# Patient Record
Sex: MALE | Race: WHITE | Employment: FULL TIME | URBAN - METROPOLITAN AREA
[De-identification: names, ages, dates, MRNs, and addresses within clinical notes are randomized per-mention and may not be internally consistent; named-entity substitution may affect disease eponyms.]

---

## 2020-11-19 RX ORDER — CETIRIZINE HCL 10 MG
10 TABLET ORAL
COMMUNITY

## 2020-11-20 ENCOUNTER — TRANSCRIBE ORDER (OUTPATIENT)
Dept: REGISTRATION | Age: 60
End: 2020-11-20

## 2020-11-20 ENCOUNTER — HOSPITAL ENCOUNTER (OUTPATIENT)
Dept: PREADMISSION TESTING | Age: 60
Discharge: HOME | End: 2020-11-20
Payer: OTHER GOVERNMENT

## 2020-11-20 DIAGNOSIS — Z01.812 BLOOD TESTS PRIOR TO TREATMENT OR PROCEDURE: ICD-10-CM

## 2020-11-20 DIAGNOSIS — Z01.812 BLOOD TESTS PRIOR TO TREATMENT OR PROCEDURE: Primary | ICD-10-CM

## 2020-11-20 DIAGNOSIS — Z20.828 EXPOSURE TO SARS-ASSOCIATED CORONAVIRUS: ICD-10-CM

## 2020-11-20 PROCEDURE — 87635 SARS-COV-2 COVID-19 AMP PRB: CPT

## 2020-11-21 LAB — SARS-COV-2, COV2NT: NOT DETECTED

## 2020-11-23 ENCOUNTER — ANESTHESIA EVENT (OUTPATIENT)
Dept: SURGERY | Age: 60
End: 2020-11-23
Payer: OTHER GOVERNMENT

## 2020-11-23 NOTE — PERIOP NOTES
Called surgeon's office and left message on voicemail for Francine Dudley regarding missing Med. Cl. for this patient.

## 2020-11-24 ENCOUNTER — HOSPITAL ENCOUNTER (OUTPATIENT)
Age: 60
Setting detail: OUTPATIENT SURGERY
Discharge: HOME | End: 2020-11-24
Attending: OPHTHALMOLOGY | Admitting: OPHTHALMOLOGY
Payer: OTHER GOVERNMENT

## 2020-11-24 ENCOUNTER — ANESTHESIA (OUTPATIENT)
Dept: SURGERY | Age: 60
End: 2020-11-24
Payer: OTHER GOVERNMENT

## 2020-11-24 VITALS
WEIGHT: 231.5 LBS | BODY MASS INDEX: 31.36 KG/M2 | RESPIRATION RATE: 18 BRPM | OXYGEN SATURATION: 99 % | TEMPERATURE: 97.7 F | DIASTOLIC BLOOD PRESSURE: 98 MMHG | HEIGHT: 72 IN | SYSTOLIC BLOOD PRESSURE: 153 MMHG | HEART RATE: 78 BPM

## 2020-11-24 PROCEDURE — 74011250636 HC RX REV CODE- 250/636: Performed by: NURSE ANESTHETIST, CERTIFIED REGISTERED

## 2020-11-24 PROCEDURE — 77030029082 HC LEN VITRCTMY DISP DTCH -B: Performed by: OPHTHALMOLOGY

## 2020-11-24 PROCEDURE — 76010000162 HC OR TIME 1.5 TO 2 HR INTENSV-TIER 1: Performed by: OPHTHALMOLOGY

## 2020-11-24 PROCEDURE — 77030016693: Performed by: OPHTHALMOLOGY

## 2020-11-24 PROCEDURE — 77030026076 HC LNS MCSCP SPRVW DSP MCGM -B: Performed by: OPHTHALMOLOGY

## 2020-11-24 PROCEDURE — 77030026916 HC ERAS HEMSTAT BVTC -B: Performed by: OPHTHALMOLOGY

## 2020-11-24 PROCEDURE — 77030018838 HC SOL IRR OPTH ALCN -B: Performed by: OPHTHALMOLOGY

## 2020-11-24 PROCEDURE — 77030025717 HC KT PIK VIT CONSTL ALCN -F: Performed by: OPHTHALMOLOGY

## 2020-11-24 PROCEDURE — 2709999900 HC NON-CHARGEABLE SUPPLY: Performed by: OPHTHALMOLOGY

## 2020-11-24 PROCEDURE — 74011000250 HC RX REV CODE- 250: Performed by: NURSE ANESTHETIST, CERTIFIED REGISTERED

## 2020-11-24 PROCEDURE — 00145 ANES PX EYE VITREORTNL SURG: CPT | Performed by: NURSE ANESTHETIST, CERTIFIED REGISTERED

## 2020-11-24 PROCEDURE — 00145 ANES PX EYE VITREORTNL SURG: CPT | Performed by: ANESTHESIOLOGY

## 2020-11-24 PROCEDURE — 74011000250 HC RX REV CODE- 250: Performed by: OPHTHALMOLOGY

## 2020-11-24 PROCEDURE — C1814 RETINAL TAMP, SILICONE OIL: HCPCS | Performed by: OPHTHALMOLOGY

## 2020-11-24 PROCEDURE — 77030013248 HC AGNT VISCEL ALCN -E: Performed by: OPHTHALMOLOGY

## 2020-11-24 PROCEDURE — 77030013311: Performed by: OPHTHALMOLOGY

## 2020-11-24 PROCEDURE — 74011250637 HC RX REV CODE- 250/637: Performed by: NURSE ANESTHETIST, CERTIFIED REGISTERED

## 2020-11-24 PROCEDURE — 77030013353: Performed by: OPHTHALMOLOGY

## 2020-11-24 PROCEDURE — 76210000020 HC REC RM PH II FIRST 0.5 HR: Performed by: OPHTHALMOLOGY

## 2020-11-24 PROCEDURE — 76060000034 HC ANESTHESIA 1.5 TO 2 HR: Performed by: OPHTHALMOLOGY

## 2020-11-24 PROCEDURE — 74011250636 HC RX REV CODE- 250/636: Performed by: OPHTHALMOLOGY

## 2020-11-24 RX ORDER — CYCLOPENTOLATE HYDROCHLORIDE 10 MG/ML
1 SOLUTION/ DROPS OPHTHALMIC
Status: COMPLETED | OUTPATIENT
Start: 2020-11-24 | End: 2020-11-24

## 2020-11-24 RX ORDER — GLYCOPYRROLATE 0.2 MG/ML
INJECTION INTRAMUSCULAR; INTRAVENOUS AS NEEDED
Status: DISCONTINUED | OUTPATIENT
Start: 2020-11-24 | End: 2020-11-24 | Stop reason: HOSPADM

## 2020-11-24 RX ORDER — CEFAZOLIN SODIUM 1 G/3ML
INJECTION, POWDER, FOR SOLUTION INTRAMUSCULAR; INTRAVENOUS AS NEEDED
Status: DISCONTINUED | OUTPATIENT
Start: 2020-11-24 | End: 2020-11-24 | Stop reason: HOSPADM

## 2020-11-24 RX ORDER — DEXAMETHASONE SODIUM PHOSPHATE 4 MG/ML
INJECTION, SOLUTION INTRA-ARTICULAR; INTRALESIONAL; INTRAMUSCULAR; INTRAVENOUS; SOFT TISSUE AS NEEDED
Status: DISCONTINUED | OUTPATIENT
Start: 2020-11-24 | End: 2020-11-24 | Stop reason: HOSPADM

## 2020-11-24 RX ORDER — ATROPINE SULFATE 10 MG/ML
SOLUTION/ DROPS OPHTHALMIC AS NEEDED
Status: DISCONTINUED | OUTPATIENT
Start: 2020-11-24 | End: 2020-11-24 | Stop reason: HOSPADM

## 2020-11-24 RX ORDER — PROPOFOL 10 MG/ML
INJECTION, EMULSION INTRAVENOUS AS NEEDED
Status: DISCONTINUED | OUTPATIENT
Start: 2020-11-24 | End: 2020-11-24 | Stop reason: HOSPADM

## 2020-11-24 RX ORDER — TRIAMCINOLONE ACETONIDE 40 MG/ML
INJECTION, SUSPENSION INTRA-ARTICULAR; INTRAMUSCULAR AS NEEDED
Status: DISCONTINUED | OUTPATIENT
Start: 2020-11-24 | End: 2020-11-24 | Stop reason: HOSPADM

## 2020-11-24 RX ORDER — LIDOCAINE HYDROCHLORIDE 20 MG/ML
INJECTION, SOLUTION EPIDURAL; INFILTRATION; INTRACAUDAL; PERINEURAL AS NEEDED
Status: DISCONTINUED | OUTPATIENT
Start: 2020-11-24 | End: 2020-11-24 | Stop reason: HOSPADM

## 2020-11-24 RX ORDER — FAMOTIDINE 20 MG/1
20 TABLET, FILM COATED ORAL ONCE
Status: COMPLETED | OUTPATIENT
Start: 2020-11-24 | End: 2020-11-24

## 2020-11-24 RX ORDER — SODIUM CHLORIDE, SODIUM LACTATE, POTASSIUM CHLORIDE, CALCIUM CHLORIDE 600; 310; 30; 20 MG/100ML; MG/100ML; MG/100ML; MG/100ML
25 INJECTION, SOLUTION INTRAVENOUS CONTINUOUS
Status: DISCONTINUED | OUTPATIENT
Start: 2020-11-24 | End: 2020-11-24 | Stop reason: HOSPADM

## 2020-11-24 RX ORDER — PHENYLEPHRINE HYDROCHLORIDE 25 MG/ML
1 SOLUTION/ DROPS OPHTHALMIC
Status: COMPLETED | OUTPATIENT
Start: 2020-11-24 | End: 2020-11-24

## 2020-11-24 RX ORDER — PROPARACAINE HYDROCHLORIDE 5 MG/ML
1 SOLUTION/ DROPS OPHTHALMIC ONCE
Status: COMPLETED | OUTPATIENT
Start: 2020-11-24 | End: 2020-11-24

## 2020-11-24 RX ADMIN — PROPARACAINE HYDROCHLORIDE 1 DROP: 5 SOLUTION/ DROPS OPHTHALMIC at 06:15

## 2020-11-24 RX ADMIN — PROPOFOL 140 MG: 10 INJECTION, EMULSION INTRAVENOUS at 08:05

## 2020-11-24 RX ADMIN — CYCLOPENTOLATE HYDROCHLORIDE 1 DROP: 10 SOLUTION/ DROPS OPHTHALMIC at 06:39

## 2020-11-24 RX ADMIN — PHENYLEPHRINE HYDROCHLORIDE 1 DROP: 25 SOLUTION/ DROPS OPHTHALMIC at 07:07

## 2020-11-24 RX ADMIN — SODIUM CHLORIDE, SODIUM LACTATE, POTASSIUM CHLORIDE, AND CALCIUM CHLORIDE 25 ML/HR: 600; 310; 30; 20 INJECTION, SOLUTION INTRAVENOUS at 06:23

## 2020-11-24 RX ADMIN — CYCLOPENTOLATE HYDROCHLORIDE 1 DROP: 10 SOLUTION/ DROPS OPHTHALMIC at 07:07

## 2020-11-24 RX ADMIN — PHENYLEPHRINE HYDROCHLORIDE 1 DROP: 25 SOLUTION/ DROPS OPHTHALMIC at 06:16

## 2020-11-24 RX ADMIN — GLYCOPYRROLATE 0.4 MG: 0.2 INJECTION INTRAMUSCULAR; INTRAVENOUS at 08:14

## 2020-11-24 RX ADMIN — LIDOCAINE HYDROCHLORIDE 50 MG: 20 INJECTION, SOLUTION INTRAVENOUS at 08:05

## 2020-11-24 RX ADMIN — CYCLOPENTOLATE HYDROCHLORIDE 1 DROP: 10 SOLUTION/ DROPS OPHTHALMIC at 06:15

## 2020-11-24 RX ADMIN — FAMOTIDINE 20 MG: 20 TABLET ORAL at 06:07

## 2020-11-24 RX ADMIN — PHENYLEPHRINE HYDROCHLORIDE 1 DROP: 25 SOLUTION/ DROPS OPHTHALMIC at 06:39

## 2020-11-24 NOTE — PERIOP NOTES
.Report received from [address]:    11/19/20 1215 11/24/20 0608 11/24/20 0930 11/24/20 0939   BP:  (!) 140/84 (!) 153/98    Pulse:  60 76 78   Resp:  18 16 18   Temp:  97.5 °F (36.4 °C) 97.7 °F (36.5 °C) 97.7 °F (36.5 °C)   SpO2:  96% 100% 99%   Weight: 105.2 kg (232 lb) 105 kg (231 lb 8 oz)     Height: 5' 11.5\" (1.816 m) 6' (1.829 m)          Patient  is oriented to time, place, person and situation    Patient OOB to chair, and tolerating fluids and activity. Vital signs stable. Pain tolerable. Lines and Drains  Peripheral Intravenous Line:      Patient assisted to chair with minimal assistance. Discharge instructions were given to patient and discussed with the wifeLori [phone](W telephone. ..due to to emergency protocols in place). No questions or concerns at this time. Patient had time to ask any questions. Patient and caregiver verbalized understanding of discharge instructions.      Patient discharged home with Jamel Coronel RN

## 2020-11-24 NOTE — ANESTHESIA POSTPROCEDURE EVALUATION
Procedure(s):  23G Valved VItrectomy Membrane Peel Laser Treatment Silicone oil Right Eye. MAC, general - backup    Anesthesia Post Evaluation      Multimodal analgesia: multimodal analgesia used between 6 hours prior to anesthesia start to PACU discharge  Patient location during evaluation: PACU  Patient participation: complete - patient participated  Level of consciousness: awake  Pain score: 0  Pain management: adequate  Airway patency: positional obstruction  Anesthetic complications: no  Cardiovascular status: acceptable  Respiratory status: acceptable  Hydration status: acceptable  Post anesthesia nausea and vomiting:  none  Final Post Anesthesia Temperature Assessment:  Normothermia (36.0-37.5 degrees C)      INITIAL Post-op Vital signs: No vitals data found for the desired time range.

## 2020-11-24 NOTE — DISCHARGE INSTRUCTIONS
Patient Education      Patient Education        Vitrectomy: What to Expect at Home  Your Recovery     Vitrectomy is a surgery to remove the vitreous gel from the middle of your eye. Vitreous gel (also called vitreous humor) is a thick, colorless, gel-like fluid that fills the large space in the middle of the eye, behind the lens. It helps the eyeball maintain its shape. During surgery, the doctor used small tools to remove the vitreous gel. (After a while, the eye makes new fluid that fills in the space again.) Then the doctor may have treated eye problems, such as a retinal detachment, a vitreous hemorrhage (bleeding in the eye), scar tissue on the retina, or tears or holes in the macula, an important part of the retina. The retina is the layer of nerve tissue at the back of the eye. At the end of the surgery, the doctor may have injected an oil or gas bubble into the eye. It lightly presses the retina against the wall of the eye. You will need to keep your head in a certain position for most of the day and night while the eye heals. If an oil bubble is used, you will need another surgery to remove the oil after the eye has healed. After the surgery, your eye may be swollen, red, or tender for several weeks. You might have some pain in your eye and your vision may be blurry for a few days after the surgery. You will need 2 to 4 weeks to recover before you can do your normal activities again. It may take longer for your vision to get back to normal.  This care sheet gives you a general idea about how long it will take for you to recover. But each person recovers at a different pace. Follow the steps below to get better as quickly as possible. How can you care for yourself at home? Activity    · Rest when you feel tired.     · Allow the eye to heal. Don't do things that might cause you to move your head.  This includes moving quickly, lifting anything heavy, or doing activities such as cleaning or gardening.     · If your doctor used an oil or gas bubble to hold the retina in place, keep your head in a certain position for most of the day and night for 1 to 3 weeks after the surgery. Make a plan for this part of your recovery, because it will be hard to do some daily activities. Your doctor will give you specific instructions. ? Do not lie on your back, or the bubble will move to the front of the eye and press against the lens instead of the retina.     · If your doctor used a gas bubble, avoid airplane travel until your doctor tells you it is safe. This is because the change in altitude may cause the gas bubble to expand and increase the pressure inside the eye.     · You will probably need to take 2 to 4 weeks off from work. It depends on the type of work you do and how you feel.     · You may drive when your vision allows it. If you are not sure, ask your doctor. Diet    · You can eat your normal diet. If your stomach is upset, try bland, low-fat foods like plain rice, broiled chicken, toast, and yogurt. Medicines    · Your doctor will tell you if and when you can restart your medicines. He or she will also give you instructions about taking any new medicines.     · If you take aspirin or some other blood thinner, be sure to talk to your doctor. He or she will tell you if and when to start taking this medicine again. Make sure that you understand exactly what your doctor wants you to do.     · Be safe with medicines. Read and follow all instructions on the label. ? If the doctor gave you a prescription medicine for pain, take it as prescribed. ? If you are not taking a prescription pain medicine, ask your doctor if you can take an over-the-counter medicine.     · You will need to use eyedrops for up to 6 weeks. Ice and elevation    · Close your eye and put ice or a cold pack on it for 10 to 20 minutes at a time.  Try to do this every 1 to 2 hours for the next 3 days (when you are awake) or until the swelling goes down. Put a thin cloth between the ice and your skin. Other instructions    · You can shower and wash your hair and face. But don't get any soap in your eye. You may want to use a wash cloth to wash your face. Some people wear swimming goggles.     · Wear sunglasses during the day. You may have to wear an eye patch or shield for a few days. Follow-up care is a key part of your treatment and safety. Be sure to make and go to all appointments, and call your doctor if you are having problems. It's also a good idea to know your test results and keep a list of the medicines you take. When should you call for help? Call 911 anytime you think you may need emergency care. For example, call if:    · You have a sudden loss of vision.     · You have severe eye pain. Call your doctor now or seek immediate medical care if:    · Your vision gets worse.     · You have new or increasing eye pain.     · You have symptoms of an eye infection, such as:  ? Pus or thick discharge coming from the eye.  ? Redness or swelling around the eye.  ? A fever.     · You have vision changes or see new flashes or floaters. (Flashes are \"thibodeaux\" that you may see when you move your head. Floaters are shadows or dark objects that \"float\" across your field of vision.)   Watch closely for changes in your health, and be sure to contact your doctor if:    · You do not get better as expected. Where can you learn more? Go to http://www.gray.com/  Enter T466 in the search box to learn more about \"Vitrectomy: What to Expect at Home. \"  Current as of: December 18, 2019               Content Version: 12.6  © 0866-1402 [employer]. Care instructions adapted under license by [employer] (which disclaims liability or warranty for this information).  If you have questions about a medical condition or this instruction, always ask your healthcare professional. Yaneli Hatch disclaims any warranty or liability for your use of this information. Learning About Coronavirus [phone])  Coronavirus [phone]): Overview  What is coronavirus (COVID-19)? The coronavirus disease (COVID-19) is caused by a virus. It is an illness that was first found in December 2019. It has since spread worldwide. The virus can cause fever, cough, and trouble breathing. In severe cases, it can cause pneumonia and make it hard to breathe without help. It can cause death. This virus spreads person-to-person through droplets from coughing and sneezing. It can also spread when you are close to someone who is infected. And it can spread when you touch something that has the virus on it, such as a doorknob or a tabletop. Coronaviruses are a large group of viruses. They cause the common cold. They also cause more serious illnesses like [place] respiratory syndrome (MERS) and severe acute respiratory syndrome (SARS). COVID-19 is caused by a novel coronavirus. That means it's a new type that has not been seen in people before. How is COVID-19 treated? Mild illness can be treated at home, but more serious illness needs to be treated in the hospital. Treatment may include medicines to reduce symptoms, plus breathing support such as oxygen therapy or a ventilator. Other treatments, such as antiviral medicines, may help people who have COVID-19. What can you do to protect yourself from COVID-19? The best way to protect yourself from getting sick is to:  · Avoid areas where there is an outbreak. · Avoid contact with people who may be infected. · Avoid crowds and try to stay at least 6 feet away from other people. · Wash your hands often, especially after you cough or sneeze. Use soap and water, and scrub for at least 20 seconds. If soap and water aren't available, use an alcohol-based hand . · Avoid touching your mouth, nose, and eyes. What can you do to avoid spreading the virus to others?   To help avoid spreading the virus to others:  · Wash your hands often with soap or alcohol-based hand sanitizers. · Cover your mouth with a tissue when you cough or sneeze. Then throw the tissue in the trash. · Use a disinfectant to clean things that you touch often. These include doorknobs, remote controls, phones, and handles on your refrigerator and microwave. And don't forget countertops, tabletops, bathrooms, and computer keyboards. · Wear a cloth face cover if you have to go to public areas. If you know or suspect that you have COVID-19:  · Stay home. Don't go to school, work, or public areas. And don't use public transportation, ride-shares, or taxis unless you have no choice. · Leave your home only if you need to get medical care or testing. But call the doctor's office first so they know you're coming. And wear a face cover. · Limit contact with people in your home. If possible, stay in a separate bedroom and use a separate bathroom. · Wear a face cover whenever you're around other people. It can help stop the spread of the virus when you cough or sneeze. · Clean and disinfect your home every day. Use household  and disinfectant wipes or sprays. Take special care to clean things that you grab with your hands. · Self-isolate until it's safe to be around others again. ? If you have symptoms, it's safe when you haven't had a fever for 3 days and your symptoms have improved and it's been at least 10 days since your symptoms started. ? If you were exposed to the virus but don't have symptoms, it's safe to be around others 14 days after exposure. ? Talk to your doctor about whether you also need testing, especially if you have a weakened immune system. When to call for help  Call 911 anytime you think you may need emergency care. For example, call if:  · You have severe trouble breathing. (You can't talk at all.)  · You have constant chest pain or pressure. · You are severely dizzy or lightheaded.   · You are confused or can't think clearly. · Your face and lips have a blue color. · You passed out (lost consciousness) or are very hard to wake up. Call your doctor now if you develop symptoms such as:  · Shortness of breath. · Fever. · Cough. If you need to get care, call ahead to the doctor's office for instructions before you go. Make sure you wear a face cover to prevent exposing other people to the virus. Where can you get the latest information? The following health organizations are tracking and studying this virus. Their websites contain the most up-to-date information. [employer] also learn what to do if you think you may have been exposed to the virus. · U.S. Centers for Disease Control and Prevention (CDC): The CDC provides updated news about the disease and travel advice. The website also tells you how to prevent the spread of infection. www.cdc.gov  · World Health Organization [facility]): WHO offers information about the virus outbreaks. WHO also has travel advice. www.who.int  Current as of: July 10, 2020               Content Version: 12.6  © 2006-2020 [employer]. Care instructions adapted under license by [employer] (which disclaims liability or warranty for this information). If you have questions about a medical condition or this instruction, always ask your healthcare professional. [address] any warranty or liability for your use of this information.   Patient armband removed and shredded

## 2020-11-24 NOTE — OP NOTES
Operative Report       Patient: Gerardo Weiner MRN: [id number]  SSN: [id number]    YOB: 1960  Age: 64 y.o. 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 OPERATION:  Pars plana vitrectomy, air/fluid exchange, endolaser,  oil infusion, right eye    ANESTHESIA:   Local MAC    COMPLICATIONS: None. ESTIMATED BLOOD LOSS:  Minimal.    DESCRIPTION OF PROCEDURE:   After informed consent, the patient received a local retrobulbar injection on the right eye with a 50/50 mixture of 0.75 % Marcaine and 2% Lidocaine. Good anesthesia was obtained. The patient was wheeled into the operating room in the supine position with the head resting in the head rest.  The eye was prepped and draped in the usual sterile fashion for intraocular surgery. A lid speculum and steri strips were applied to the right eyelid. A 23 gauge 4 mm infusion cannula was placed 3 ½ mm from the limbus inferotemporally, after confirming the position of the cannula, 2 superior 23 gauge sclerostomies were placed. The vitrectomy device and light pipe were inserted. The retina was found to be detached with a giant tear from 7-12 oclock position. The macula was found to be detached. A standard 3-port pars plana vitrectomy was then performed with thorough clearing of all the vitreous especially around the tear. The posterior hyaloid was detached without any complications. Depressed examination of the peripheral retina revealed no other retinal pathology besides the ones mentioned above. PFO was injected to flatten the tear. 360 barrier laser was applied to cover the break and rest of the peripheral retina.  A direct pfo-Silicon oil exchange was done and the eye was completely filled with 1000 cs oil. The retina was flat. The sclerotomies were sutured with 7-0 vicryl. Subconjunctival injection of Dexamethasone and Ancef were administered. Maxitrol ointment was instilled into the eye. The eye was patched and shielded and he was transferred to the recovery room in stable condition. The patient tolerated the procedure well. There were no problems or complications. The patient was wheeled out of the operating room awake and alert.     Jazmine Casas MD  November 24, 2020  9:45 AM

## 2020-11-24 NOTE — PROGRESS NOTES
Date of Surgery Update:  Boby Uriarte was seen and examined. History and physical has been reviewed. The patient has been examined.  There have been no significant clinical changes since the completion of the originally dated History and Physical.    Signed By: Danii Grimm MD     November 24, 2020 7:29 AM

## 2020-11-24 NOTE — ANESTHESIA PREPROCEDURE EVALUATION
Relevant Problems   No relevant active problems       Anesthetic History   No history of anesthetic complications            Review of Systems / Medical History  Patient summary reviewed and pertinent labs reviewed    Pulmonary          Smoker         Neuro/Psych   Within defined limits           Cardiovascular                  Exercise tolerance: >4 METS     GI/Hepatic/Renal  Within defined limits              Endo/Other        Obesity     Other Findings   Comments: Detached retina           Physical Exam    Airway  Mallampati: II  TM Distance: > 6 cm  Neck ROM: normal range of motion   Mouth opening: Normal     Cardiovascular  Regular rate and rhythm,  S1 and S2 normal,  no murmur, click, rub, or gallop             Dental    Dentition: Upper partial plate     Pulmonary  Breath sounds clear to auscultation               Abdominal  GI exam deferred       Other Findings            Anesthetic Plan    ASA: 2  Anesthesia type: MAC and general - backup          Induction: Intravenous  Anesthetic plan and risks discussed with: Patient

## 2020-11-24 NOTE — PERIOP NOTES
Pre-Op Summary    Pt arrived via car with family/friend and is oriented to time, place, person and situation. Patient with steady gait with none assistive devices. Visit Vitals  BP (!) 140/84 (BP 1 Location: Right arm, BP Patient Position: At rest)   Pulse 60   Temp 97.5 °F (36.4 °C)   Resp 18   Ht 6' (1.829 m)   Wt 105 kg (231 lb 8 oz)   SpO2 96%   BMI 31.40 kg/m²       Peripheral IV located on Right hand . Patients belongings are located in storage. Patient's point of contact is Nahum Romo (wife) and their contact number is: [phone]. They will be leaving and coming back. They are able to receive medication information. They will be their ride home.

## 2022-05-09 RX ORDER — SODIUM CHLORIDE 0.9 % (FLUSH) 0.9 %
5-40 SYRINGE (ML) INJECTION EVERY 8 HOURS
Status: CANCELLED | OUTPATIENT
Start: 2022-05-09

## 2022-05-09 RX ORDER — SODIUM CHLORIDE 0.9 % (FLUSH) 0.9 %
5-40 SYRINGE (ML) INJECTION AS NEEDED
Status: CANCELLED | OUTPATIENT
Start: 2022-05-09

## 2022-05-09 RX ORDER — ATROPINE SULFATE 0.1 MG/ML
0.5 INJECTION INTRAVENOUS
Status: CANCELLED | OUTPATIENT
Start: 2022-05-09 | End: 2022-05-10

## 2022-05-09 RX ORDER — DEXTROMETHORPHAN/PSEUDOEPHED 2.5-7.5/.8
1.2 DROPS ORAL
Status: CANCELLED | OUTPATIENT
Start: 2022-05-09

## 2022-05-09 RX ORDER — DIPHENHYDRAMINE HYDROCHLORIDE 50 MG/ML
50 INJECTION, SOLUTION INTRAMUSCULAR; INTRAVENOUS ONCE
Status: CANCELLED | OUTPATIENT
Start: 2022-05-09 | End: 2022-05-09

## 2022-05-09 RX ORDER — EPINEPHRINE 0.1 MG/ML
1 INJECTION INTRACARDIAC; INTRAVENOUS
Status: CANCELLED | OUTPATIENT
Start: 2022-05-09 | End: 2022-05-10

## 2022-05-13 ENCOUNTER — HOSPITAL ENCOUNTER (OUTPATIENT)
Age: 62
Setting detail: OUTPATIENT SURGERY
Discharge: HOME | End: 2022-05-13
Attending: INTERNAL MEDICINE | Admitting: INTERNAL MEDICINE
Payer: OTHER GOVERNMENT

## 2022-05-13 VITALS
RESPIRATION RATE: 16 BRPM | HEART RATE: 61 BPM | DIASTOLIC BLOOD PRESSURE: 75 MMHG | SYSTOLIC BLOOD PRESSURE: 120 MMHG | BODY MASS INDEX: 29.35 KG/M2 | HEIGHT: 72 IN | TEMPERATURE: 98 F | WEIGHT: 216.7 LBS | OXYGEN SATURATION: 98 %

## 2022-05-13 PROCEDURE — 77030013991 HC SNR POLYP ENDOSC BSC -A: Performed by: INTERNAL MEDICINE

## 2022-05-13 PROCEDURE — 2709999900 HC NON-CHARGEABLE SUPPLY: Performed by: INTERNAL MEDICINE

## 2022-05-13 PROCEDURE — 74011250636 HC RX REV CODE- 250/636: Performed by: INTERNAL MEDICINE

## 2022-05-13 PROCEDURE — 77030039961 HC KT CUST COLON BSC -D: Performed by: INTERNAL MEDICINE

## 2022-05-13 PROCEDURE — 77030040361 HC SLV COMPR DVT MDII -B: Performed by: INTERNAL MEDICINE

## 2022-05-13 PROCEDURE — G0500 MOD SEDAT ENDO SERVICE >5YRS: HCPCS | Performed by: INTERNAL MEDICINE

## 2022-05-13 PROCEDURE — 88305 TISSUE EXAM BY PATHOLOGIST: CPT

## 2022-05-13 PROCEDURE — 99153 MOD SED SAME PHYS/QHP EA: CPT | Performed by: INTERNAL MEDICINE

## 2022-05-13 PROCEDURE — 76040000007: Performed by: INTERNAL MEDICINE

## 2022-05-13 RX ORDER — NALOXONE HYDROCHLORIDE 0.4 MG/ML
0.4 INJECTION, SOLUTION INTRAMUSCULAR; INTRAVENOUS; SUBCUTANEOUS
Status: DISCONTINUED | OUTPATIENT
Start: 2022-05-13 | End: 2022-05-13 | Stop reason: HOSPADM

## 2022-05-13 RX ORDER — FENTANYL CITRATE 50 UG/ML
100 INJECTION, SOLUTION INTRAMUSCULAR; INTRAVENOUS
Status: DISCONTINUED | OUTPATIENT
Start: 2022-05-13 | End: 2022-05-13 | Stop reason: HOSPADM

## 2022-05-13 RX ORDER — SODIUM CHLORIDE 9 MG/ML
1000 INJECTION, SOLUTION INTRAVENOUS CONTINUOUS
Status: DISCONTINUED | OUTPATIENT
Start: 2022-05-13 | End: 2022-05-13 | Stop reason: HOSPADM

## 2022-05-13 RX ORDER — MIDAZOLAM HYDROCHLORIDE 1 MG/ML
.25-5 INJECTION, SOLUTION INTRAMUSCULAR; INTRAVENOUS
Status: DISCONTINUED | OUTPATIENT
Start: 2022-05-13 | End: 2022-05-13 | Stop reason: HOSPADM

## 2022-05-13 RX ORDER — FLUMAZENIL 0.1 MG/ML
0.2 INJECTION INTRAVENOUS
Status: DISCONTINUED | OUTPATIENT
Start: 2022-05-13 | End: 2022-05-13 | Stop reason: HOSPADM

## 2022-05-13 RX ADMIN — SODIUM CHLORIDE 1000 ML: 900 INJECTION, SOLUTION INTRAVENOUS at 10:36

## 2022-05-13 NOTE — DISCHARGE INSTRUCTIONS
Arin [facility]  [id number]  1960    COLON DISCHARGE INSTRUCTIONS    Discomfort:  Redness at IV site- apply warm compress to area; if redness or soreness persist- contact your physician  There may be a slight amount of blood passed from the rectum  Gaseous discomfort- walking, belching will help relieve any discomfort  You may not operate a vehicle til the next day. You may not engage in an occupation involving machinery or appliances til the next day. You may not drink alcoholic beverages til the next day. DIET:   High fiber diet. ACTIVITY:  You may not  resume your normal daily activities til the next day. it is recommended that you spend the remainder of the day resting -  avoid any strenuous activity. CALL M.D.  IF ANY SIGN OF:   Increasing pain, nausea, vomiting  Abdominal distension (swelling)  New increased bleeding (oral or rectal)  Fever (chills)  Pain in chest area  Bloody discharge from nose or mouth  Shortness of breath    You may not  take any Advil, Aspirin, Ibuprofen, Motrin, Aleve, or Goodys for 5 days, ONLY  Tylenol as needed for pain. Post procedure diagnosis:  HEMORRHOIDS; MILD SIGMOID DIVERTICULOSIS; POLYPS; loopy colon    Follow-up Instructions: Your follow up colonoscopy will be in 10 years. We will notify you the results of your biopsy by letter within 2 weeks. Lenora Sanchez MD  May 13, 2022       DISCHARGE SUMMARY from Nurse    PATIENT INSTRUCTIONS:    After general anesthesia or intravenous sedation, for 24 hours or while taking prescription Narcotics:  · Limit your activities  · Do not drive and operate hazardous machinery  · Do not make important personal or business decisions  · Do  not drink alcoholic beverages  · If you have not urinated within 8 hours after discharge, please contact your surgeon on call.     Report the following to your surgeon:  · Excessive pain, swelling, redness or odor of or around the surgical area  · Temperature over 100.5  · Nausea and vomiting lasting longer than 4 hours or if unable to take medications  · Any signs of decreased circulation or nerve impairment to extremity: change in color, persistent  numbness, tingling, coldness or increase pain  · Any questions    What to do at Home:  Recommended activity: Activity as tolerated and no driving for today, as listed above    If you experience any of the following symptoms as above, please follow up with Dr. Shayna Franks. *  Please give a list of your current medications to your Primary Care Provider. *  Please update this list whenever your medications are discontinued, doses are      changed, or new medications (including over-the-counter products) are added. *  Please carry medication information at all times in case of emergency situations. These are general instructions for a healthy lifestyle:    No smoking/ No tobacco products/ Avoid exposure to second hand smoke  Surgeon General's Warning:  Quitting smoking now greatly reduces serious risk to your health. Obesity, smoking, and sedentary lifestyle greatly increases your risk for illness    A healthy diet, regular physical exercise & weight monitoring are important for maintaining a healthy lifestyle    You may be retaining fluid if you have a history of heart failure or if you experience any of the following symptoms:  Weight gain of 3 pounds or more overnight or 5 pounds in a week, increased swelling in our hands or feet or shortness of breath while lying flat in bed. Please call your doctor as soon as you notice any of these symptoms; do not wait until your next office visit. The discharge information has been reviewed with the patient and spouse. The patient and spouse verbalized understanding. Discharge medications reviewed with the patient and spouse and appropriate educational materials and side effects teaching were provided.   Patient armband removed and shredded    ___________________________________________________________________________________________________________________________________

## 2022-05-13 NOTE — PROCEDURES
[facility]  Colonoscopy Procedure Report  _______________________________________________________  Patient: Dorien Gaucher                                        Attending Physician: Ruthie Huitron MD    Patient ID: [id number]                                    Referring Physician: JOSE LUIS Bush    Exam Date: 5/13/2022      Introduction: A  63 y.o. male patient, presents for inpatient Colonoscopy    Indications:  Pt of [place], [place], here for 5-10yr Recall, for surveillance of colonic polyps, w/FHx of IBD: Daughter with complicated Crohn's Dz. Last colonoscopy (1st exam) was done in 2012 @[facility]: Polyps removed, 5 to 10yr Recall per Pt (?Patho). Old records unavailable at this time, any records recovered will be scanned to chart at later date. Average risk, no FHx of colon cancer. Daughter diagnosed with Crohn's Dz @32yo. Asymptomatic of GI complaints. BMI: 31.06 (Tall Frame), BM: 2-3/day. PM/SH: Seasonal Allergies, DM2, s/p Hernia Repair (1998), Currently has right eye cataract & repaired retinal detachment. Consent: The benefits, risks, and alternatives to the procedure were discussed and informed consent was obtained from the patient. Preparation: EKG, pulse, pulse oximetry and blood pressure were monitored throughout the procedure. ASA Classification: Class I- . The heart is an S1-S2 and regular heart rate and rhythm. Lungs are clear to auscultation and percussion. Abdomen is soft, nondistended, and nontender. Mental Status: awake, alert, and oriented to person, place, and time    Medications:  · Fentanyl 100 mcg IV before procedure. · Versed 5 mg IV throughout the procedure. Rectal Exam: Normal Rectal Exam. No Blood. Prostate not enlarged. Pathology Specimens:  1    Procedure: The colonoscope was passed with difficulty through the anus under direct visualization and advanced to the cecum and 5 cm inside the terminal ileum.  Retroflexion is made in the ascending colon. The scope was withdrawn and the mucosa was carefully examined. The quality of the preparation was good. The views were excellent. The patient's toleration of the procedure was excellent. The exam was done twice to the cecum. Total time is 27 minutes and withdrawal time is 20 minutes. Findings:    Rectum:   Small internal hemorrhoids. Sigmoid:   6 mm sessile sigmoid polyp at 30 cm, cold snared  Descending Colon:   Normal   Transverse Colon:   6 mm sessile flattened hyperplastic polyp in the proximal transverse colon, cold snared  Ascending Colon:   Normal  Cecum:   Normal  Terminal Ileum:   Normal.       Unplanned Events: There were no unplanned events. Estimated Blood Loss: Negligible  IMPLANTS: * No implants in log *  Impressions: Small internal hemorrhoids. The colon is slightly loopy and long. 6 mm sessile sigmoid polyp at 30 cm, cold snared. 6 mm sessile flattened hyperplastic polyp in the proximal transverse colon, cold snared. Normal Mucosa. No blood, polyps or AVM found. Complications: None; patient tolerated the procedure well. Recommendations:  · Discharge home when standard parameters are met. · Resume a high fiber diet. · Resume own medications. Avoid all NSAID's for 5 days  · Colonoscopy recommendation in 10 years.   · Take Miralax and/ or Colace 100 mg on regular basis if constipated    Procedure Codes:    Charlie Jaspalwil [[id number]]    Endoscope Information:  Model Number(s)    [id number]   Assistant: None  Signed By: Gerber Maloney MD Date: 5/13/2022

## 2022-05-13 NOTE — H&P
Assessment/Plan  # Detail Type Description    1. Assessment Personal history of colonic polyps (Z86.010). Impression Pt of [place], [place], here for 5-10yr Recall, for surveillance of colonic polyps, w/FHx of IBD: Daughter with complicated Crohn's Dz.  _________________________________  *Last colonoscopy (1st exam) was done in 2012 @[facility]: Polyps removed, 5 to 10yr Recall per Pt (?Patho). -Old records unavailable at this time, any records recovered will be scanned to chart at later date.  _________________________________  Average risk, no FHx of colon cancer. Daughter diagnosed with Crohn's Dz @34yo. Asymptomatic of GI complaints. BMI: 31.06 (Tall Frame), BM: 2-3/day. *PM/SH: Seasonal Allergies, DM2, s/p Hernia Repair (1998), Currently has right eye cataract & repaired retinal detachment. No reported hx of abdominal surgeries, strokes, or CAD. Patient Plan *C-scope Plan:  Colonoscopy ordered with Dr. Yulisa Junior with Miralax bowel prep, and Mag Citrate 2 days before prep, and Miralax and stool softeners starting 3 days before prep.  -Patient is advised that they should take their aspirin (if prescribed) up until the day of procedure.  -Patient is advised to take Thyroid meds, BP meds, beta blockers, and any cardiac meds the AM of procedure with sip clear liquid after prep. -Diabetic medication instructions (See Below). *C-scope Risks:  Stressed importance of following all bowel preparation instructions. Explained the procedure to the patient including all risks and benefits. These risks consist of missed lesions on exam, bleeding, and bowel perforation with possible need for admission to the hospital, and in the most extensive of  circumstances, the patient may require surgery. Pt verbalized understanding of these risks and is agreeable with this procedure. Plan Orders Further diagnostic evaluations ordered today include(s) DIAGNOSTIC COLONOSCOPY to be performed.  He will be scheduled for GASTROENTEROLOGY PROCEDURE, Next Lab Date is within 5 month on 05/13/2022. Clinical information/comments: at location [facility]. The surgeon scheduled is Lizbeth Woods MD. An assistant has not been requested. 2. Assessment Family history of other diseases of the digestive system (Z83.79). Impression *FHx of IBD- Crohn's Dz:  -Daughter diagnosed with Crohn's Dz @32yo. Now s/p Partial colectomy d/t section of Necrotic bowel. 3. Assessment Type 2 diabetes without complication (L87.3). Impression Diabetic patient, on oral medication: Metformin. Patient Plan -Patient is advised not to take pills for diabetes the day ahead of the procedure. Patient is advised (if insulin dependent) to take half of the usual long acting insulin the day before procedure (or as advised by the prescribing provider), and to follow accuchecks closely, 'rescuing' for a blood sugar below 90              This 63year old  patient was referred by Ce Bacon. This 63year old male presents for Hx polyp/colon cancer. History of Present Illness  1. Hx polyp/colon cancer   Prior screening:  colonoscopy. Denies risk factors. Pertinent negatives include abdominal pain, change in bowel habits, change in stool caliber, constipation, decreased appetite, diarrhea, melena, nausea, rectal bleeding, vomiting, weight gain and weight loss. Additional information: No family history of colon cancer, Patient has a family history of Crohn's/colitis and BM: 2-3x daily. Comments: . *FHx of IBD- Crohn's Dz:  -Daughter diagnosed with Crohn's Dz @32yo. Now s/p Partial colectomy d/t section of Necrotic bowel.  _________________________________  *Last colonoscopy (1st exam) was done in 2012 @[facility]: Polyps removed, 5 to 10yr Recall per Pt (?Patho). -Old records unavailable at this time, any records recovered will be scanned to chart at later date.       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 N   Daughter    Crohn's disease 35 N   Spouse    Currently Being evaluated for Narrowing of the Bowel. N   Family History Comments  Relationship Family Member Name Condition Comments   Daughter  Crohn's disease RM 2022 - Crohn's Dz - Onset Age @34yo. Now s/p Partial colectomy d/t section of Necrotic bowel. Social History  (Detailed)  Tobacco use reviewed. Preferred language is [place]. Marital Status/Family/Social Support  Marital status:      Smoking status: Occasional tobacco smoker. Tobacco Screening  Patient has used tobacco.     Smoking Status  Type Smoking Status Usage Per Day Years Used Pack Years Total Pack Years    Occasional tobacco smoker         Alcohol  There is a history of alcohol use. consumed occasionally. Caffeine  The patient uses caffeine: coffee. Medications (active prior to today)  Medication Instructions Start Date Stop Date Refilled Elsewhere   cetirizine 10 mg tablet Take 10 mg by mouth daily as needed for Allergies. //   Y   Combigan 0.2 %-0.5 % eye drops 1 GTT BID OD 2021  Y   metformin 500 mg tablet take 1 tablet by oral route every day with morning and evening meals //   Y   multivitamin tablet take 1 tablet by oral route every day //   Y     Patient Status   Completed with information received for patient in a summary of care record.      Medication Reconciliation  Medications reconciled today.    Medication Reviewed  Adherence Medication Name Sig Desc Elsewhere Status   taking as directed multivitamin tablet take 1 tablet by oral route every day Y Verified   taking as directed metformin 500 mg tablet take 1 tablet by oral route every day with morning and evening meals Y Verified   taking as directed cetirizine 10 mg tablet Take 10 mg by mouth daily as needed for Allergies. Y Verified     Medications (Added, Continued or Stopped today)  Start Date Medication Directions PRN Status PRN Reason Instruction Stop Date    cetirizine 10 mg tablet Take 10 mg by mouth daily as needed for Allergies. N      08/20/2021 Combigan 0.2 %-0.5 % eye drops 1 GTT BID OD N   01/06/2022    metformin 500 mg tablet take 1 tablet by oral route every day with morning and evening meals N       multivitamin tablet take 1 tablet by oral route every day N        Allergies  Ingredient Reaction (Severity) Medication Name Comment   NO KNOWN ALLERGIES            Orders  Status Lab Order Time Frame Comments   ordered DIAGNOSTIC COLONOSCOPY     ordered GASTROENTEROLOGY PROCEDURE within 5 month at location [address] surgeon scheduled is Constantino Cruz MD. An assistant has not been requested. Review of Systems  System Neg/Pos Details   Constitutional Negative Fever, Weight gain and Weight loss. ENMT Negative Sinus Infection. Eyes Negative Double vision. Respiratory Negative Asthma, Chronic cough and Dyspnea. Cardio Negative Chest pain, Edema and Irregular heartbeat/palpitations. GI Negative Abdominal pain, Change in bowel habits, Change in stool caliber, Constipation, Decreased appetite, Diarrhea, Dysphagia, Heartburn, Hematemesis, Hematochezia, Melena, Nausea, Rectal bleeding, Reflux and Vomiting.  Negative Dysuria and Hematuria. Endocrine Negative Cold intolerance and Heat intolerance. Neuro Negative Dizziness, Headache, Numbness and Tremors.    Psych Negative Anxiety, Depression and Increased stress. Integumentary Negative Hives, Pruritus and Rash. MS Negative Back pain, Joint pain and Myalgia. Hema/Lymph Negative Easy bleeding, Easy bruising and Lymphadenopathy. Allergic/Immuno Negative Food allergies and Immunosuppression. Vital Signs   Height  Time ft in cm Last Measured Height Position   3:38 PM 6.0  182.88 01/06/2022      Weight/BSA/BMI  Time lb oz kg Context BMI kg/m2 BSA m2   3:38 .00  103.873  31.06 2.30     05/13/22 1010 98.1 °F (36.7 °C) 64 122/65 --        Respiratory Therapy (last day)    Date/Time Resp SpO2 O2 Device O2 Flow Rate (L/min) [facility]   05/13/22 1010 16 97 % None (Room air) -- [address]       Physical  Exam  Exam Findings Details   Male GI Quick Exam Comments Tall Frame. Constitutional Normal Well developed. Eyes Normal Conjunctiva - Right: Normal, Left: Normal. Sclera - Right: Normal, Left: Normal.   Nasopharynx Normal Lips/teeth/gums - Normal.   Neck Exam Normal Inspection - Normal.   Respiratory Normal Inspection - Normal.   Cardiovascular Normal Rhythm - Regular. Extra sounds - None. Murmurs - None. Vascular Normal Pulses - Brachial: Normal.   Skin Normal Inspection - Normal.   Musculoskeletal Normal Hands/Wrist - Right: Normal, Left: Normal.   Extremity Normal No edema. Neurological Normal Fine motor skills - Normal.   Psychiatric Normal Orientation - Oriented to time, place, person & situation. Appropriate mood and affect. Patient Education  # Patient Education   1.  Colon Polyps: Care Instructions         Active Patient Care Team Members  Name Contact Agency Type Support Role Relationship Active Date Inactive Date Specialty   Jorge Silva   Patient provider PCP      Geoff Murillo   Emergency Contact Spouse      Radha King   encounter provider    Gastroenterology     No change in H&P

## 2022-10-25 ENCOUNTER — TRANSCRIBE ORDER (OUTPATIENT)
Dept: SCHEDULING | Age: 62
End: 2022-10-25

## 2022-10-25 ENCOUNTER — HOSPITAL ENCOUNTER (OUTPATIENT)
Dept: MRI IMAGING | Age: 62
Discharge: HOME | End: 2022-10-25
Attending: NURSE PRACTITIONER
Payer: OTHER GOVERNMENT

## 2022-10-25 DIAGNOSIS — M25.512 LEFT SHOULDER PAIN: ICD-10-CM

## 2022-10-25 DIAGNOSIS — M25.512 LEFT SHOULDER PAIN: Primary | ICD-10-CM

## 2022-10-25 PROCEDURE — 73221 MRI JOINT UPR EXTREM W/O DYE: CPT

## (undated) DEVICE — SURGICAL PROCEDURE PACK EYE 23 GA CUST

## (undated) DEVICE — GARMENT,MEDLINE,DVT,INT,CALF,MED, GEN2: Brand: MEDLINE

## (undated) DEVICE — CANNULA CUSH AD W/ 14FT TBG

## (undated) DEVICE — ERASER HEMSTAT BPLR TAPR 23G -- WET-FIELD

## (undated) DEVICE — SINGLE PORT MANIFOLD: Brand: NEPTUNE 2

## (undated) DEVICE — CATH IV SAFE STR 22GX1IN BLU -- PROTECTIV PLUS

## (undated) DEVICE — SET ADMIN 16ML TBNG L100IN 2 Y INJ SITE IV PIGGY BK DISP

## (undated) DEVICE — SPONGE GZ W4XL4IN COT 12 PLY TYP VII WVN C FLD DSGN

## (undated) DEVICE — NDL PRT INJ NSAF BLNT 18GX1.5 --

## (undated) DEVICE — LENS VITRCTMY FLAT DISP

## (undated) DEVICE — SYR 5ML 1/5 GRAD LL NSAF LF --

## (undated) DEVICE — SUPER VIEW® STERILE LENS PACK FOR USE WITH THE SUPER VIEW® SYSTEM AND THE BIOM®: Brand: SUPER VIEW® DISPOSABLE LENS SET

## (undated) DEVICE — SOLUTION IRRIG 1000ML H2O STRL BLT

## (undated) DEVICE — SYR 50ML LR LCK 1ML GRAD NSAF --

## (undated) DEVICE — PILLOW OR POS AD L5IN R INTUB FOAM HDRST SLOT DISP GENTLE

## (undated) DEVICE — SYRINGE 50ML E/T

## (undated) DEVICE — TUBING, SUCTION, 1/4" X 12', STRAIGHT: Brand: MEDLINE

## (undated) DEVICE — SILIKON™ 1000 (PURIFIED POLYDIMETHYLSILOXANE) IS HIGHLY PURIFIED LONG CHAIN POLYDIMETHYLSILOXANE TRIMETHYLSILOXY TERMINATED SILICONE OIL. IT IS STERILE,NON-PYROGENIC, CLEAR, COLORLESS AND HAS A VISCOSITY OF 1000 CS. FOR USE AS A POST-OPERATIVE RETINAL TAMPONADE DURING VITREORETINAL SURGERY. SILIKON™ 1000IS COMPOSED OF SILICON, OXYGEN, CARBON AND HYDROGEN ATOMS. SILIKON™ 1000 IS IMMISCIBLE WITH AQUEOUS COMPONENTS AND IS RELATIVELY INERT MATERIAL WITHLITTLE BIOLOGICAL TOXICITY POTENTIAL.: Brand: SILIKON™

## (undated) DEVICE — SYR 3ML LL TIP 1/10ML GRAD --

## (undated) DEVICE — TRNQT TEXT 1X18IN BLU LF DISP -- CONVERT TO ITEM 362165

## (undated) DEVICE — STERILE LATEX POWDER-FREE SURGICAL GLOVESWITH NITRILE COATING: Brand: PROTEXIS

## (undated) DEVICE — MICROSURGICAL INSTRUMENT 25GA SOFT TIP NEEDLE: Brand: ALCON

## (undated) DEVICE — KENDALL RADIOLUCENT FOAM MONITORING ELECTRODE RECTANGULAR SHAPE: Brand: KENDALL

## (undated) DEVICE — MAJ-1414 SINGLE USE ADPATER BIOPSY VALV: Brand: SINGLE USE ADAPTOR BIOPSY VALVE

## (undated) DEVICE — SOLUTION IRRIGATION BAL SALT SOLUTION 500 ML BTL 6/CA BSS +

## (undated) DEVICE — SNARE POLYP SM W13MMXL240CM SHTH DIA2.4MM OVL FLX DISP

## (undated) DEVICE — CATH SUC CTRL PRT TRIFLO 14FR --

## (undated) DEVICE — SPONGE GZ W4XL4IN RAYON POLY 4 PLY NONWOVEN FASTER WICKING

## (undated) DEVICE — KIT,ANTI FOG,W/SPONGE & FLUID,SOFT PACK: Brand: MEDLINE

## (undated) DEVICE — Device: Brand: 25G THUMB ADJUSTABLE & INTUITIVE ENDOPROBE® BOX OF 6

## (undated) DEVICE — SURGICAL PROCEDURE PACK VITRECTOMY EYE CUST

## (undated) DEVICE — NEEDLE FLTR 5UM 19GA L1.5IN FEM LUERLOCK CONN THN WALL FOR

## (undated) DEVICE — CANNULA ANTR CHMBR OPHTH WASHOUT 19GA

## (undated) DEVICE — NDL FLTR TIP 5 MIC 18GX1.5IN --

## (undated) DEVICE — MICROSURGICAL INSTRUMENT ANTERIOR CHAMBER CANNULA 30GA: Brand: ALCON

## (undated) DEVICE — Device

## (undated) DEVICE — MEDI-VAC NON-CONDUCTIVE SUCTION TUBING 6MM X 6.1M (20 FT.) L: Brand: CARDINAL HEALTH

## (undated) DEVICE — SOLUTION IV 500ML 0.9% SOD CHL FLX CONT

## (undated) DEVICE — TRAP SPEC COLL POLYP POLYSTYR --

## (undated) DEVICE — AGENT VISCOELASTIC 5ML --

## (undated) DEVICE — WRISTBAND ID AD W2.5XL9.5CM RED VYN ADH CLSR UNI-PRINT